# Patient Record
Sex: FEMALE | Race: BLACK OR AFRICAN AMERICAN | Employment: UNEMPLOYED | URBAN - METROPOLITAN AREA
[De-identification: names, ages, dates, MRNs, and addresses within clinical notes are randomized per-mention and may not be internally consistent; named-entity substitution may affect disease eponyms.]

---

## 2017-05-31 ENCOUNTER — OFFICE VISIT (OUTPATIENT)
Dept: INTERNAL MEDICINE CLINIC | Age: 27
End: 2017-05-31

## 2017-05-31 ENCOUNTER — HOSPITAL ENCOUNTER (OUTPATIENT)
Dept: LAB | Age: 27
Discharge: HOME | End: 2017-05-31

## 2017-05-31 VITALS
SYSTOLIC BLOOD PRESSURE: 103 MMHG | DIASTOLIC BLOOD PRESSURE: 69 MMHG | OXYGEN SATURATION: 96 % | HEIGHT: 62 IN | HEART RATE: 83 BPM | WEIGHT: 139.8 LBS | TEMPERATURE: 98.6 F | BODY MASS INDEX: 25.73 KG/M2 | RESPIRATION RATE: 18 BRPM

## 2017-05-31 DIAGNOSIS — Z13.228 SCREENING FOR ENDOCRINE, NUTRITIONAL, METABOLIC AND IMMUNITY DISORDER: ICD-10-CM

## 2017-05-31 DIAGNOSIS — Z01.419 WELL WOMAN EXAM WITH ROUTINE GYNECOLOGICAL EXAM: Primary | ICD-10-CM

## 2017-05-31 DIAGNOSIS — Z13.0 SCREENING FOR ENDOCRINE, NUTRITIONAL, METABOLIC AND IMMUNITY DISORDER: ICD-10-CM

## 2017-05-31 DIAGNOSIS — Z13.21 SCREENING FOR ENDOCRINE, NUTRITIONAL, METABOLIC AND IMMUNITY DISORDER: ICD-10-CM

## 2017-05-31 DIAGNOSIS — Z11.4 SCREENING FOR HIV WITHOUT PRESENCE OF RISK FACTORS: ICD-10-CM

## 2017-05-31 DIAGNOSIS — Z13.220 LIPID SCREENING: ICD-10-CM

## 2017-05-31 DIAGNOSIS — Z13.29 SCREENING FOR ENDOCRINE, NUTRITIONAL, METABOLIC AND IMMUNITY DISORDER: ICD-10-CM

## 2017-05-31 DIAGNOSIS — R45.86 MOOD CHANGES: ICD-10-CM

## 2017-05-31 LAB
BILIRUB UR QL STRIP: NEGATIVE
GLUCOSE UR-MCNC: NEGATIVE MG/DL
KETONES P FAST UR STRIP-MCNC: NEGATIVE MG/DL
PH UR STRIP: 6 [PH] (ref 4.6–8)
PROT UR QL STRIP: NEGATIVE MG/DL
SP GR UR STRIP: 1 (ref 1–1.03)
UA UROBILINOGEN AMB POC: NORMAL (ref 0.2–1)
URINALYSIS CLARITY POC: CLEAR
URINALYSIS COLOR POC: YELLOW
URINE BLOOD POC: NEGATIVE
URINE LEUKOCYTES POC: NEGATIVE
URINE NITRITES POC: NEGATIVE

## 2017-05-31 PROCEDURE — 99001 SPECIMEN HANDLING PT-LAB: CPT | Performed by: NURSE PRACTITIONER

## 2017-05-31 NOTE — PROGRESS NOTES
Pt is here for   Chief Complaint   Patient presents with    Complete Physical     with pap, pt states she would like HIV test included in labs     Pt denies pain at this time    1. Have you been to the ER, urgent care clinic since your last visit? Hospitalized since your last visit? No    2. Have you seen or consulted any other health care providers outside of the [address] [place] [address] since your last visit? Include any pap smears or colon screening.  No

## 2017-05-31 NOTE — MR AVS SNAPSHOT
Visit Information Date & Time Provider Department Dept. Phone Encounter #  
 5/31/2017  2:20 PM Tram Reyes NP [id number] [facility] [phone] [id number] Follow-up Instructions Return in about 1 year (around 5/31/2018) for well woman with labs. Upcoming Health Maintenance Date Due  
 HPV AGE 9Y-34Y (1 of 3 - Female 3 Dose Series) 2/13/2003 DTaP/Tdap/Td series (1 - Tdap) 2/13/2013 INFLUENZA AGE 9 TO ADULT 8/1/2017 PAP AKA CERVICAL CYTOLOGY 6/27/2019 Allergies as of 5/31/2017  Review Complete On: 5/31/2017 By: Tram Reyes NP No Known Allergies Current Immunizations  Never Reviewed Name Date Tdap  Incomplete Not reviewed this visit You Were Diagnosed With   
  
 Codes Comments Well woman exam with routine gynecological exam    -  Primary ICD-10-CM: L17.152 ICD-9-CM: V72.31 Encounter for immunization     ICD-10-CM: V75 ICD-9-CM: V03.89 Screening for HIV without presence of risk factors     ICD-10-CM: Z11.4 ICD-9-CM: V73.89 Screening for endocrine, nutritional, metabolic and immunity disorder     ICD-10-CM: Z13.29, Z13.21, Z13.228, Z13.0 ICD-9-CM: V77.99 Lipid screening     ICD-10-CM: W67.407 ICD-9-CM: V77.91 Vitals BP Pulse Temp Resp Height(growth percentile) Weight(growth percentile) 103/69 (BP 1 Location: Right arm, BP Patient Position: Sitting) 83 98.6 °F (37 °C) (Oral) 18 5' 2\" (1.575 m) 139 lb 12.8 oz (63.4 kg) LMP SpO2 BMI OB Status Smoking Status 05/22/2017 96% 25.57 kg/m2 Having regular periods Current Some Day Smoker Vitals History BMI and BSA Data Body Mass Index Body Surface Area 25.57 kg/m 2 1.67 m 2 Preferred Pharmacy Pharmacy Name Phone [email or web address], [address] [phone] Your Updated Medication List  
  
Notice  As of 5/31/2017  2:58 PM  
 You have not been prescribed any medications. We Performed the Following AMB POC URINALYSIS DIP STICK AUTO W/ MICRO [67485 CPT(R)] AMB POC URINALYSIS DIP STICK AUTO W/O MICRO [30026 CPT(R)] CBC WITH AUTOMATED DIFF [99842 CPT(R)] HIV 1/2 AG/AB, 4TH GENERATION,W RFLX CONFIRM [[id number] Custom] LIPID PANEL [22088 CPT(R)] METABOLIC PANEL, COMPREHENSIVE [57326 CPT(R)] [address] [id number] Custom] TETANUS, DIPHTHERIA TOXOIDS AND ACELLULAR PERTUSSIS VACCINE (TDAP), IN INDIVIDS. >=7, IM [id number] CPT(R)] Follow-up Instructions Return in about 1 year (around 2018) for well woman with labs. Patient Instructions Tdap (Tetanus, Diphtheria, Pertussis) Vaccine: What You Need to Know Why get vaccinated? Tetanus, diphtheria, and pertussis are very serious diseases. Tdap vaccine can protect us from these diseases. And Tdap vaccine given to pregnant women can protect  babies against pertussis. Tetanus (lockjaw) is rare in the [facility] today. It causes painful muscle tightening and stiffness, usually all over the body. · It can lead to tightening of muscles in the head and neck so you can't open your mouth, swallow, or sometimes even breathe. Tetanus kills about 1 out of 10 people who are infected even after receiving the best medical care. Diphtheria is also rare in the [place] today. It can cause a thick coating to form in the back of the throat. · It can lead to breathing problems, heart failure, paralysis, and death. Pertussis (whooping cough) causes severe coughing spells, which can cause difficulty breathing, vomiting, and disturbed sleep. · It can also lead to weight loss, incontinence, and rib fractures. Up to 2 in 100 adolescents and 5 in 100 adults with pertussis are hospitalized or have complications, which could include pneumonia or death. These diseases are caused by bacteria.  Diphtheria and pertussis are spread from person to person through secretions from coughing or sneezing. Tetanus enters the body through cuts, scratches, or wounds. Before vaccines, as many as 200,000 cases of diphtheria, 200,000 cases of pertussis, and hundreds of cases of tetanus were reported in the [place] each year. Since vaccination began, reports of cases for tetanus and diphtheria have dropped by about 99% and for pertussis by about 80%. Tdap vaccine The Tdap vaccine can protect adolescents and adults from tetanus, diphtheria, and pertussis. One dose of Tdap is routinely given at age 6 or 15. People who did not get Tdap at that age should get it as soon as possible. Tdap is especially important for health care professionals and anyone having close contact with a baby younger than 12 months. Pregnant women should get a dose of Tdap during every pregnancy, to protect the  from pertussis. Infants are most at risk for severe, life-threatening complications from pertussis. Another vaccine, called Td, protects against tetanus and diphtheria, but not pertussis. A Td booster should be given every 10 years. Tdap may be given as one of these boosters if you have never gotten Tdap before. Tdap may also be given after a severe cut or burn to prevent tetanus infection. Your doctor or the person giving you the vaccine can give you more information. Tdap may safely be given at the same time as other vaccines. Some people should not get this vaccine · A person who has ever had a life-threatening allergic reaction after a previous dose of any diphtheria-, tetanus-, or pertussis-containing vaccine, OR has a severe allergy to any part of this vaccine, should not get Tdap vaccine. Tell the person giving the vaccine about any severe allergies.  
· Anyone who had coma or long repeated seizures within 7 days after a childhood dose of DTP or DTaP, or a previous dose of Tdap, should not get Tdap, unless a cause other than the vaccine was found. They can still get Td. · Talk to your doctor if you: 
¨ Have seizures or another nervous system problem. ¨ Had severe pain or swelling after any vaccine containing diphtheria, tetanus, or pertussis. ¨ Ever had a condition called Guillain-Barré Syndrome (GBS). ¨ Aren't feeling well on the day the shot is scheduled. Risks With any medicine, including vaccines, there is a chance of side effects. These are usually mild and go away on their own. Serious reactions are also possible but are rare. Most people who get Tdap vaccine do not have any problems with it. Mild problems following Tdap 
(Did not interfere with activities) · Pain where the shot was given (about 3 in 4 adolescents or 2 in 3 adults) · Redness or swelling where the shot was given (about 1 person in 5) · Mild fever of at least 100.4°F (up to about 1 in 25 adolescents or 1 in 100 adults) · Headache (about 3 or 4 people in 10) · Tiredness (about 1 person in 3 or 4) · Nausea, vomiting, diarrhea, stomachache (up to 1 in 4 adolescents or 1 in 10 adults) · Chills, sore joints (about 1 person in 10) · Body aches (about 1 person in 3 or 4) · Rash, swollen glands (uncommon) Moderate problems following Tdap (Interfered with activities, but did not require medical attention) · Pain where the shot was given (up to 1 in 5 or 6) · Redness or swelling where the shot was given (up to about 1 in 16 adolescents or 1 in 12 adults) · Fever over 102°F (about 1 in 100 adolescents or 1 in 250 adults) · Headache (about 1 in 7 adolescents or 1 in 10 adults) · Nausea, vomiting, diarrhea, stomachache (up to 1 to 3 people in 100) · Swelling of the entire arm where the shot was given (up to about 1 in 500) Severe problems following Tdap 
(Unable to perform usual activities; required medical attention) · Swelling, severe pain, bleeding and redness in the arm where the shot was given (rare) Problems that could happen after any vaccine: · People sometimes faint after a medical procedure, including vaccination. Sitting or lying down for about 15 minutes can help prevent fainting, and injuries caused by a fall. Tell your doctor if you feel dizzy or have vision changes or ringing in the ears. · Some people get severe pain in the shoulder and have difficulty moving the arm where a shot was given. This happens very rarely. · Any medication can cause a severe allergic reaction. Such reactions from a vaccine are very rare, estimated at fewer than 1 in a million doses, and would happen within a few minutes to a few hours after the vaccination. As with any medicine, there is a very remote chance of a vaccine causing a serious injury or death. The safety of vaccines is always being monitored. For more information, visit: www.cdc.gov/vaccinesafety. What if there is a serious problem? What should I look for? · Look for anything that concerns you, such as signs of a severe allergic reaction, very high fever, or unusual behavior. Signs of a severe allergic reaction can include hives, swelling of the face and throat, difficulty breathing, a fast heartbeat, dizziness, and weakness. These would usually start a few minutes to a few hours after the vaccination. What should I do? · If you think it is a severe allergic reaction or other emergency that can't wait, call 9-1-1 or get the person to the nearest hospital. Otherwise, call your doctor. · Afterward, the reaction should be reported to the Vaccine Adverse Event Reporting System (VAERS). Your doctor might file this report, or you can do it yourself through the VAERS web site at www.vaers. hhs.gov, or by calling 9-460.753.1794. VAERS does not give medical advice.  
The [employer] [place] Vaccine Injury Compensation Program 
The National Vaccine Injury Compensation Program (VICP) is a federal program that was created to compensate people who may have been injured by certain vaccines. Persons who believe they may have been injured by a vaccine can learn about the program and about filing a claim by calling 9-626.472.1054 or visiting the [address] website at www.[facility].gov/vaccinecompensation. There is a time limit to file a claim for compensation. How can I learn more? · Ask your doctor. He or she can give you the vaccine package insert or suggest other sources of information. · Call your local or state health department. · Contact the Centers for Disease Control and Prevention (CDC): 
¨ Call 2-603.329.6820 (1-800-CDC-INFO) or ¨ Visit CDC's website at www.cdc.gov/vaccines Vaccine Information Statement (Interim) Tdap Vaccine 
(2/24/15) [address] KAR[address]z 218AK-55 [facility] and [employer] Centers for Disease Control and Prevention Many Vaccine Information Statements are available in [place] and other languages. See www.immunize.org/vis. Muchas hojas de información sobre vacunas están disponibles en español y en otros idiomas. Visite www.immunize.org/vis. Care instructions adapted under license by your healthcare professional. If you have questions about a medical condition or this instruction, always ask your healthcare professional. [address] any warranty or liability for your use of this information. Well Visit, Ages 25 to 48: Care Instructions Your Care Instructions Physical exams can help you stay healthy. Your doctor has checked your overall health and may have suggested ways to take good care of yourself. He or she also may have recommended tests. At home, you can help prevent illness with healthy eating, regular exercise, and other steps. Follow-up care is a key part of your treatment and safety. Be sure to make and go to all appointments, and call your doctor if you are having problems. It's also a good idea to know your test results and keep a list of the medicines you take. How can you care for yourself at home? · Reach and stay at a healthy weight. This will lower your risk for many problems, such as obesity, diabetes, heart disease, and high blood pressure. · Get at least 30 minutes of physical activity on most days of the week. Walking is a good choice. You also may want to do other activities, such as running, swimming, cycling, or playing tennis or team sports. Discuss any changes in your exercise program with your doctor. · Do not smoke or allow others to smoke around you. If you need help quitting, talk to your doctor about stop-smoking programs and medicines. These can increase your chances of quitting for good. · Talk to your doctor about whether you have any risk factors for sexually transmitted infections (STIs). Having one sex partner (who does not have STIs and does not have sex with anyone else) is a good way to avoid these infections. · Use birth control if you do not want to have children at this time. Talk with your doctor about the choices available and what might be best for you. · Protect your skin from too much sun. When you're outdoors from 10 a.m. to 4 p.m., stay in the shade or cover up with clothing and a hat with a wide brim. Wear sunglasses that block UV rays. Even when it's cloudy, put broad-spectrum sunscreen (SPF 30 or higher) on any exposed skin. · See a dentist one or two times a year for checkups and to have your teeth cleaned. · Wear a seat belt in the car. · Drink alcohol in moderation, if at all. That means no more than 2 drinks a day for men and 1 drink a day for women. Follow your doctor's advice about when to have certain tests. These tests can spot problems early. For everyone · Cholesterol. Have the fat (cholesterol) in your blood tested after age 21. Your doctor will tell you how often to have this done based on your age, family history, or other things that can increase your risk for heart disease. · Blood pressure. Have your blood pressure checked during a routine doctor visit. Your doctor will tell you how often to check your blood pressure based on your age, your blood pressure results, and other factors. · Vision. Talk with your doctor about how often to have a glaucoma test. 
· Diabetes. Ask your doctor whether you should have tests for diabetes. · Colon cancer. Have a test for colon cancer at age 48. You may have one of several tests. If you are younger than 48, you may need a test earlier if you have any risk factors. Risk factors include whether you already had a precancerous polyp removed from your colon or whether your parent, brother, sister, or child has had colon cancer. For women · Breast exam and mammogram. Talk to your doctor about when you should have a clinical breast exam and a mammogram. Medical experts differ on whether and how often women under 50 should have these tests. Your doctor can help you decide what is right for you. · Pap test and pelvic exam. Begin Pap tests at age 24. A Pap test is the best way to find cervical cancer. The test often is part of a pelvic exam. Ask how often to have this test. 
· Tests for sexually transmitted infections (STIs). Ask whether you should have tests for STIs. You may be at risk if you have sex with more than one person, especially if your partners do not wear condoms. For men · Tests for sexually transmitted infections (STIs). Ask whether you should have tests for STIs. You may be at risk if you have sex with more than one person, especially if you do not wear a condom. · Testicular cancer exam. Ask your doctor whether you should check your testicles regularly. · Prostate exam. Talk to your doctor about whether you should have a blood test (called a PSA test) for prostate cancer.  Experts differ on whether and when men should have this test. Some experts suggest it if you are older than 39 and are -American or have a father or brother who got prostate cancer when he was younger than 72. When should you call for help? Watch closely for changes in your health, and be sure to contact your doctor if you have any problems or symptoms that concern you. Where can you learn more? Go to http://hema-arron.info/. Enter P072 in the search box to learn more about \"Well Visit, Ages 25 to 48: Care Instructions. \" Current as of: July 19, 2016 Content Version: 11.2 © 7210-0452 [employer]. Care instructions adapted under license by [employer] (which disclaims liability or warranty for this information). If you have questions about a medical condition or this instruction, always ask your healthcare professional. [address] any warranty or liability for your use of this information. Introducing hospitals & HEALTH SERVICES! Ronny Martinez introduces Contacts+ patient portal. Now you can access parts of your medical record, email your doctor's office, and request medication refills online. 1. In your internet browser, go to [email or web address] 2. Click on the First Time User? Click Here link in the Sign In box. You will see the New Member Sign Up page. 3. Enter your Contacts+ Access Code exactly as it appears below. You will not need to use this code after youve completed the sign-up process. If you do not sign up before the expiration date, you must request a new code. · Contacts+ Access Code: [email or web address] Expires: 8/21/2017 12:41 PM 
 
4. Enter the last four digits of your Social Security Number (xxxx) and Date of Birth (mm/dd/yyyy) as indicated and click Submit. You will be taken to the next sign-up page. 5. Create a Contacts+ ID. This will be your Contacts+ login ID and cannot be changed, so think of one that is secure and easy to remember. 6. Create a [employer] password. You can change your password at any time. 7. Enter your Password Reset Question and Answer. This can be used at a later time if you forget your password. 8. Enter your e-mail address. You will receive e-mail notification when new information is available in [address] 9. Click Sign Up. You can now view and download portions of your medical record. 10. Click the Download Summary menu link to download a portable copy of your medical information. If you have questions, please visit the Frequently Asked Questions section of the [employer] website. Remember, [employer] is NOT to be used for urgent needs. For medical emergencies, dial 911. Now available from your iPhone and Android! Please provide this summary of care documentation to your next provider. Your primary care clinician is listed as JANICE High. If you have any questions after today's visit, please call [phone].

## 2017-05-31 NOTE — PATIENT INSTRUCTIONS
Tdap (Tetanus, Diphtheria, Pertussis) Vaccine: What You Need to Know  Why get vaccinated? Tetanus, diphtheria, and pertussis are very serious diseases. Tdap vaccine can protect us from these diseases. And Tdap vaccine given to pregnant women can protect  babies against pertussis. Tetanus (lockjaw) is rare in the [facility] today. It causes painful muscle tightening and stiffness, usually all over the body. · It can lead to tightening of muscles in the head and neck so you can't open your mouth, swallow, or sometimes even breathe. Tetanus kills about 1 out of 10 people who are infected even after receiving the best medical care. Diphtheria is also rare in the [place] today. It can cause a thick coating to form in the back of the throat. · It can lead to breathing problems, heart failure, paralysis, and death. Pertussis (whooping cough) causes severe coughing spells, which can cause difficulty breathing, vomiting, and disturbed sleep. · It can also lead to weight loss, incontinence, and rib fractures. Up to 2 in 100 adolescents and 5 in 100 adults with pertussis are hospitalized or have complications, which could include pneumonia or death. These diseases are caused by bacteria. Diphtheria and pertussis are spread from person to person through secretions from coughing or sneezing. Tetanus enters the body through cuts, scratches, or wounds. Before vaccines, as many as 200,000 cases of diphtheria, 200,000 cases of pertussis, and hundreds of cases of tetanus were reported in the [place] each year. Since vaccination began, reports of cases for tetanus and diphtheria have dropped by about 99% and for pertussis by about 80%. Tdap vaccine  The Tdap vaccine can protect adolescents and adults from tetanus, diphtheria, and pertussis. One dose of Tdap is routinely given at age 6 or 15. People who did not get Tdap at that age should get it as soon as possible.   Tdap is especially important for health care professionals and anyone having close contact with a baby younger than 12 months. Pregnant women should get a dose of Tdap during every pregnancy, to protect the  from pertussis. Infants are most at risk for severe, life-threatening complications from pertussis. Another vaccine, called Td, protects against tetanus and diphtheria, but not pertussis. A Td booster should be given every 10 years. Tdap may be given as one of these boosters if you have never gotten Tdap before. Tdap may also be given after a severe cut or burn to prevent tetanus infection. Your doctor or the person giving you the vaccine can give you more information. Tdap may safely be given at the same time as other vaccines. Some people should not get this vaccine  · A person who has ever had a life-threatening allergic reaction after a previous dose of any diphtheria-, tetanus-, or pertussis-containing vaccine, OR has a severe allergy to any part of this vaccine, should not get Tdap vaccine. Tell the person giving the vaccine about any severe allergies. · Anyone who had coma or long repeated seizures within 7 days after a childhood dose of DTP or DTaP, or a previous dose of Tdap, should not get Tdap, unless a cause other than the vaccine was found. They can still get Td. · Talk to your doctor if you:  ¨ Have seizures or another nervous system problem. ¨ Had severe pain or swelling after any vaccine containing diphtheria, tetanus, or pertussis. ¨ Ever had a condition called Guillain-Barré Syndrome (GBS). ¨ Aren't feeling well on the day the shot is scheduled. Risks  With any medicine, including vaccines, there is a chance of side effects. These are usually mild and go away on their own. Serious reactions are also possible but are rare. Most people who get Tdap vaccine do not have any problems with it.   Mild problems following Tdap  (Did not interfere with activities)  · Pain where the shot was given (about 3 in 4 adolescents or 2 in 3 adults)  · Redness or swelling where the shot was given (about 1 person in 5)  · Mild fever of at least 100.4°F (up to about 1 in 25 adolescents or 1 in 100 adults)  · Headache (about 3 or 4 people in 10)  · Tiredness (about 1 person in 3 or 4)  · Nausea, vomiting, diarrhea, stomachache (up to 1 in 4 adolescents or 1 in 10 adults)  · Chills, sore joints (about 1 person in 10)  · Body aches (about 1 person in 3 or 4)  · Rash, swollen glands (uncommon)  Moderate problems following Tdap  (Interfered with activities, but did not require medical attention)  · Pain where the shot was given (up to 1 in 5 or 6)  · Redness or swelling where the shot was given (up to about 1 in 16 adolescents or 1 in 12 adults)  · Fever over 102°F (about 1 in 100 adolescents or 1 in 250 adults)  · Headache (about 1 in 7 adolescents or 1 in 10 adults)  · Nausea, vomiting, diarrhea, stomachache (up to 1 to 3 people in 100)  · Swelling of the entire arm where the shot was given (up to about 1 in 500)  Severe problems following Tdap  (Unable to perform usual activities; required medical attention)  · Swelling, severe pain, bleeding and redness in the arm where the shot was given (rare)  Problems that could happen after any vaccine:  · People sometimes faint after a medical procedure, including vaccination. Sitting or lying down for about 15 minutes can help prevent fainting, and injuries caused by a fall. Tell your doctor if you feel dizzy or have vision changes or ringing in the ears. · Some people get severe pain in the shoulder and have difficulty moving the arm where a shot was given. This happens very rarely. · Any medication can cause a severe allergic reaction. Such reactions from a vaccine are very rare, estimated at fewer than 1 in a million doses, and would happen within a few minutes to a few hours after the vaccination.   As with any medicine, there is a very remote chance of a vaccine causing a serious injury or death. The safety of vaccines is always being monitored. For more information, visit: www.cdc.gov/vaccinesafety. What if there is a serious problem? What should I look for? · Look for anything that concerns you, such as signs of a severe allergic reaction, very high fever, or unusual behavior. Signs of a severe allergic reaction can include hives, swelling of the face and throat, difficulty breathing, a fast heartbeat, dizziness, and weakness. These would usually start a few minutes to a few hours after the vaccination. What should I do? · If you think it is a severe allergic reaction or other emergency that can't wait, call 9-1-1 or get the person to the nearest hospital. Otherwise, call your doctor. · Afterward, the reaction should be reported to the Vaccine Adverse Event Reporting System (VAERS). Your doctor might file this report, or you can do it yourself through the VAERS web site at www.vaers. [facility].gov, or by calling 3-523.650.4203. VAERS does not give medical advice. The National Vaccine Injury Compensation Program  The National Vaccine Injury Compensation Program (VICP) is a federal program that was created to compensate people who may have been injured by certain vaccines. Persons who believe they may have been injured by a vaccine can learn about the program and about filing a claim by calling 4-325.514.2055 or visiting the [facility] website at www.[facility].gov/vaccinecompensation. There is a time limit to file a claim for compensation. How can I learn more? · Ask your doctor. He or she can give you the vaccine package insert or suggest other sources of information. · Call your local or state health department. · Contact the Centers for Disease Control and Prevention (CDC):  ¨ Call 2-708.643.3936 (1-800-CDC-INFO) or  ¨ Visit CDC's website at www.cdc.gov/vaccines  Vaccine Information Statement (Interim)  Tdap Vaccine  (2/24/15)  42 KARLY Fernando [id number]  [facility] for Disease Control and Prevention  Many Vaccine Information Statements are available in [place]er and other languages. See www.immunize.org/vis. Muchas hojas de información sobre vacunas están disponibles en español y en otros idiomas. Visite www.immunize.org/vis. Care instructions adapted under license by your healthcare professional. If you have questions about a medical condition or this instruction, always ask your healthcare professional. [place] [address] any warranty or liability for your use of this information. Well Visit, Ages 25 to 48: Care Instructions  Your Care Instructions  Physical exams can help you stay healthy. Your doctor has checked your overall health and may have suggested ways to take good care of yourself. He or she also may have recommended tests. At home, you can help prevent illness with healthy eating, regular exercise, and other steps. Follow-up care is a key part of your treatment and safety. Be sure to make and go to all appointments, and call your doctor if you are having problems. It's also a good idea to know your test results and keep a list of the medicines you take. How can you care for yourself at home? · Reach and stay at a healthy weight. This will lower your risk for many problems, such as obesity, diabetes, heart disease, and high blood pressure. · Get at least 30 minutes of physical activity on most days of the week. Walking is a good choice. You also may want to do other activities, such as running, swimming, cycling, or playing tennis or team sports. Discuss any changes in your exercise program with your doctor. · Do not smoke or allow others to smoke around you. If you need help quitting, talk to your doctor about stop-smoking programs and medicines. These can increase your chances of quitting for good. · Talk to your doctor about whether you have any risk factors for sexually transmitted infections (STIs).  Having one sex partner (who does not have STIs and does not have sex with anyone else) is a good way to avoid these infections. · Use birth control if you do not want to have children at this time. Talk with your doctor about the choices available and what might be best for you. · Protect your skin from too much sun. When you're outdoors from 10 a.m. to 4 p.m., stay in the shade or cover up with clothing and a hat with a wide brim. Wear sunglasses that block UV rays. Even when it's cloudy, put broad-spectrum sunscreen (SPF 30 or higher) on any exposed skin. · See a dentist one or two times a year for checkups and to have your teeth cleaned. · Wear a seat belt in the car. · Drink alcohol in moderation, if at all. That means no more than 2 drinks a day for men and 1 drink a day for women. Follow your doctor's advice about when to have certain tests. These tests can spot problems early. For everyone  · Cholesterol. Have the fat (cholesterol) in your blood tested after age 21. Your doctor will tell you how often to have this done based on your age, family history, or other things that can increase your risk for heart disease. · Blood pressure. Have your blood pressure checked during a routine doctor visit. Your doctor will tell you how often to check your blood pressure based on your age, your blood pressure results, and other factors. · Vision. Talk with your doctor about how often to have a glaucoma test.  · Diabetes. Ask your doctor whether you should have tests for diabetes. · Colon cancer. Have a test for colon cancer at age 48. You may have one of several tests. If you are younger than 48, you may need a test earlier if you have any risk factors. Risk factors include whether you already had a precancerous polyp removed from your colon or whether your parent, brother, sister, or child has had colon cancer.   For women  · Breast exam and mammogram. Talk to your doctor about when you should have a clinical breast exam and a mammogram. Medical experts differ on whether and how often women under 50 should have these tests. Your doctor can help you decide what is right for you. · Pap test and pelvic exam. Begin Pap tests at age 24. A Pap test is the best way to find cervical cancer. The test often is part of a pelvic exam. Ask how often to have this test.  · Tests for sexually transmitted infections (STIs). Ask whether you should have tests for STIs. You may be at risk if you have sex with more than one person, especially if your partners do not wear condoms. For men  · Tests for sexually transmitted infections (STIs). Ask whether you should have tests for STIs. You may be at risk if you have sex with more than one person, especially if you do not wear a condom. · Testicular cancer exam. Ask your doctor whether you should check your testicles regularly. · Prostate exam. Talk to your doctor about whether you should have a blood test (called a PSA test) for prostate cancer. Experts differ on whether and when men should have this test. Some experts suggest it if you are older than 39 and are -American or have a father or brother who got prostate cancer when he was younger than 72. When should you call for help? Watch closely for changes in your health, and be sure to contact your doctor if you have any problems or symptoms that concern you. Where can you learn more? Go to http://hema-arron.info/. Enter P072 in the search box to learn more about \"Well Visit, Ages 25 to 48: Care Instructions. \"  Current as of: July 19, 2016  Content Version: 11.2  © 5088-3138 [employer]. Care instructions adapted under license by [employer] (which disclaims liability or warranty for this information). If you have questions about a medical condition or this instruction, always ask your healthcare professional. [place] [address] any warranty or liability for your use of this information.
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
## 2017-06-05 DIAGNOSIS — A59.01 TRICHOMONAS VAGINITIS: Primary | ICD-10-CM

## 2017-06-05 LAB
A VAGINAE DNA VAG QL NAA+PROBE: ABNORMAL SCORE
BVAB2 DNA VAG QL NAA+PROBE: ABNORMAL SCORE
C ALBICANS DNA VAG QL NAA+PROBE: NEGATIVE
C GLABRATA DNA VAG QL NAA+PROBE: NEGATIVE
C TRACH RRNA SPEC QL NAA+PROBE: NEGATIVE
MEGA1 DNA VAG QL NAA+PROBE: ABNORMAL SCORE
N GONORRHOEA RRNA SPEC QL NAA+PROBE: NEGATIVE
T VAGINALIS RRNA SPEC QL NAA+PROBE: POSITIVE

## 2017-06-05 RX ORDER — METRONIDAZOLE 500 MG/1
2000 TABLET ORAL ONCE
Qty: 4 TAB | Refills: 0 | Status: SHIPPED | OUTPATIENT
Start: 2017-06-05 | End: 2017-06-05

## 2017-07-03 ENCOUNTER — OFFICE VISIT (OUTPATIENT)
Dept: BEHAVIORAL/MENTAL HEALTH CLINIC | Age: 27
End: 2017-07-03

## 2017-07-03 VITALS
SYSTOLIC BLOOD PRESSURE: 107 MMHG | OXYGEN SATURATION: 98 % | BODY MASS INDEX: 26.13 KG/M2 | HEIGHT: 62 IN | WEIGHT: 142 LBS | HEART RATE: 70 BPM | DIASTOLIC BLOOD PRESSURE: 75 MMHG

## 2017-07-03 DIAGNOSIS — F12.10 CANNABIS ABUSE: ICD-10-CM

## 2017-07-03 DIAGNOSIS — F39 MOOD DISORDER (HCC): Primary | ICD-10-CM

## 2017-07-03 RX ORDER — CITALOPRAM 20 MG/1
TABLET, FILM COATED ORAL
Qty: 30 TAB | Refills: 1 | Status: SHIPPED | OUTPATIENT
Start: 2017-07-03 | End: 2019-07-30
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
## 2017-07-13 LAB
A VAGINAE DNA VAG QL NAA+PROBE: NORMAL SCORE
ALBUMIN SERPL-MCNC: 4.7 G/DL (ref 3.5–5.5)
ALBUMIN/GLOB SERPL: 1.6 {RATIO} (ref 1.2–2.2)
ALP SERPL-CCNC: 44 IU/L (ref 39–117)
ALT SERPL-CCNC: 9 IU/L (ref 0–32)
AST SERPL-CCNC: 13 IU/L (ref 0–40)
BASOPHILS # BLD AUTO: 0 X10E3/UL (ref 0–0.2)
BASOPHILS NFR BLD AUTO: 1 %
BILIRUB SERPL-MCNC: 0.6 MG/DL (ref 0–1.2)
BUN SERPL-MCNC: 8 MG/DL (ref 6–20)
BUN/CREAT SERPL: 12 (ref 9–23)
CALCIUM SERPL-MCNC: 9.6 MG/DL (ref 8.7–10.2)
CHLORIDE SERPL-SCNC: 99 MMOL/L (ref 96–106)
CHOLEST SERPL-MCNC: 181 MG/DL (ref 100–199)
CO2 SERPL-SCNC: 25 MMOL/L (ref 18–29)
CREAT SERPL-MCNC: 0.66 MG/DL (ref 0.57–1)
EOSINOPHIL # BLD AUTO: 0.1 X10E3/UL (ref 0–0.4)
EOSINOPHIL NFR BLD AUTO: 2 %
ERYTHROCYTE [DISTWIDTH] IN BLOOD BY AUTOMATED COUNT: 12.8 % (ref 12.3–15.4)
GLOBULIN SER CALC-MCNC: 2.9 G/DL (ref 1.5–4.5)
GLUCOSE SERPL-MCNC: 81 MG/DL (ref 65–99)
HCT VFR BLD AUTO: 40.7 % (ref 34–46.6)
HDLC SERPL-MCNC: 63 MG/DL
HGB BLD-MCNC: 13.9 G/DL (ref 11.1–15.9)
HIV 1+2 AB+HIV1 P24 AG SERPL QL IA: NON REACTIVE
IMM GRANULOCYTES # BLD: 0 X10E3/UL (ref 0–0.1)
IMM GRANULOCYTES NFR BLD: 0 %
INTERPRETATION, 910389: NORMAL
LDLC SERPL CALC-MCNC: 99 MG/DL (ref 0–99)
LYMPHOCYTES # BLD AUTO: 2 X10E3/UL (ref 0.7–3.1)
LYMPHOCYTES NFR BLD AUTO: 48 %
MCH RBC QN AUTO: 32.5 PG (ref 26.6–33)
MCHC RBC AUTO-ENTMCNC: 34.2 G/DL (ref 31.5–35.7)
MCV RBC AUTO: 95 FL (ref 79–97)
MONOCYTES # BLD AUTO: 0.2 X10E3/UL (ref 0.1–0.9)
MONOCYTES NFR BLD AUTO: 5 %
NEUTROPHILS # BLD AUTO: 1.8 X10E3/UL (ref 1.4–7)
NEUTROPHILS NFR BLD AUTO: 44 %
PLATELET # BLD AUTO: 323 X10E3/UL (ref 150–379)
POTASSIUM SERPL-SCNC: 4 MMOL/L (ref 3.5–5.2)
PROT SERPL-MCNC: 7.6 G/DL (ref 6–8.5)
RBC # BLD AUTO: 4.28 X10E6/UL (ref 3.77–5.28)
SODIUM SERPL-SCNC: 140 MMOL/L (ref 134–144)
TRIGL SERPL-MCNC: 96 MG/DL (ref 0–149)
VLDLC SERPL CALC-MCNC: 19 MG/DL (ref 5–40)
WBC # BLD AUTO: 4.1 X10E3/UL (ref 3.4–10.8)

## 2017-08-23 ENCOUNTER — DOCUMENTATION ONLY (OUTPATIENT)
Dept: BEHAVIORAL/MENTAL HEALTH CLINIC | Age: 27
End: 2017-08-23

## 2017-08-23 NOTE — LETTER
8/23/2017 Ms. Chivo Harvey [id number] Dear Ms. Bhupendra Quintanilla, 
 
A good relationship between provider and patient is essential for quality medical care. The provider/patient relationship depends upon mutual trust, respect, and rapport. Our relationship has reached a point where these criteria are no longer intact. You have consistently failed to come in for scheduled appointments (no shows: August 2016, August 2017, cancelled August 2017). For this reason, I will no longer continue to serve as your provider and [facility] must withdraw from your medical care and treatment effective this date. Although I have terminated our providerpatient relationship, I will assist you with the transition of your health care to other providers, as follows: 
 
1. On-Going/Acute Care. I will provide on-going/acute care for you for a period of 30 days, but in no case later than 9/21/17. After this date, you may seek care with another provider or your local emergency room. 2. Referrals for Future Medical Care. As you may need medical attention in the future, I recommend that you promptly find another provider to care for you. You may require ongoing medical attention for any current or future medical conditions, including but not limited to the following: anxiety/ depression. You may contact your insurance company or the [email or web address] for a [employer] [facility] [employer] [facility] [employer] of physicians who are accepting new patients 3. Medical Records. I will provide you or your new health care provider with a copy of your records upon your request.  Since your records are confidential, Ill require your written authorization to make them available to another provider. Enclosed is an authorization form. Please complete it and return it to me so that we may transition your care. I extend to you my best wishes for your future health. Sincerely, Matheus Rangel, NP Enclosure

## 2017-08-23 NOTE — PROGRESS NOTES
No show August 2016, never came back in till July 2017, cancelled appt in August 2017, then no showed to rescheduled appt in august 2018  Termination letter sent.

## 2018-11-07 PROBLEM — J45.909 ASTHMA: Status: ACTIVE | Noted: 2018-11-07

## 2019-07-30 ENCOUNTER — OFFICE VISIT (OUTPATIENT)
Dept: INTERNAL MEDICINE CLINIC | Age: 29
End: 2019-07-30

## 2019-07-30 VITALS
OXYGEN SATURATION: 97 % | RESPIRATION RATE: 17 BRPM | TEMPERATURE: 98.7 F | SYSTOLIC BLOOD PRESSURE: 100 MMHG | HEIGHT: 62 IN | DIASTOLIC BLOOD PRESSURE: 68 MMHG | HEART RATE: 71 BPM | WEIGHT: 164 LBS | BODY MASS INDEX: 30.18 KG/M2

## 2019-07-30 DIAGNOSIS — Z13.220 SCREENING FOR LIPID DISORDERS: ICD-10-CM

## 2019-07-30 DIAGNOSIS — Z13.228 SCREENING FOR ENDOCRINE, NUTRITIONAL, METABOLIC AND IMMUNITY DISORDER: ICD-10-CM

## 2019-07-30 DIAGNOSIS — F41.9 ANXIETY AND DEPRESSION: ICD-10-CM

## 2019-07-30 DIAGNOSIS — Z01.419 WELL WOMAN EXAM WITH ROUTINE GYNECOLOGICAL EXAM: Primary | ICD-10-CM

## 2019-07-30 DIAGNOSIS — Z13.29 SCREENING FOR ENDOCRINE, NUTRITIONAL, METABOLIC AND IMMUNITY DISORDER: ICD-10-CM

## 2019-07-30 DIAGNOSIS — Z13.21 SCREENING FOR ENDOCRINE, NUTRITIONAL, METABOLIC AND IMMUNITY DISORDER: ICD-10-CM

## 2019-07-30 DIAGNOSIS — Z12.4 SCREENING FOR MALIGNANT NEOPLASM OF CERVIX: ICD-10-CM

## 2019-07-30 DIAGNOSIS — F32.A ANXIETY AND DEPRESSION: ICD-10-CM

## 2019-07-30 DIAGNOSIS — Z13.0 SCREENING FOR ENDOCRINE, NUTRITIONAL, METABOLIC AND IMMUNITY DISORDER: ICD-10-CM

## 2019-07-30 DIAGNOSIS — Z11.4 SCREENING FOR HIV WITHOUT PRESENCE OF RISK FACTORS: ICD-10-CM

## 2019-07-30 RX ORDER — TRAZODONE HYDROCHLORIDE 50 MG/1
TABLET ORAL
Refills: 0 | COMMUNITY
Start: 2019-06-10

## 2019-07-30 RX ORDER — SERTRALINE HYDROCHLORIDE 50 MG/1
TABLET, FILM COATED ORAL
Refills: 0 | COMMUNITY
Start: 2019-06-10 | End: 2019-07-30

## 2019-07-30 RX ORDER — DROSPIRENONE AND ETHINYL ESTRADIOL 0.02-3(28)
KIT ORAL
Refills: 2 | COMMUNITY
Start: 2019-05-15 | End: 2020-12-10

## 2019-07-30 RX ORDER — HYDROXYZINE 25 MG/1
TABLET, FILM COATED ORAL
Refills: 0 | COMMUNITY
Start: 2019-06-10

## 2019-07-30 RX ORDER — SERTRALINE HYDROCHLORIDE 25 MG/1
25 TABLET, FILM COATED ORAL DAILY
Qty: 30 TAB | Refills: 0 | Status: SHIPPED | OUTPATIENT
Start: 2019-07-30

## 2019-07-30 NOTE — PROGRESS NOTES
Pt is here for   Chief Complaint   Patient presents with    Well Woman     pap    Depression     1. Have you been to the ER, urgent care clinic since your last visit? Hospitalized since your last visit? No    2. Have you seen or consulted any other health care providers outside of the [address] [place] [address] since your last visit? Include any pap smears or colon screening.  No    Pt denies pain at this time

## 2019-07-30 NOTE — PROGRESS NOTES
Subjective:   34 y.o. female for Well Woman Check. Patient's last menstrual period was 07/23/2019. Social History: single partner, contraception - OCP (Oral Contraceptive Pills). Pertinent past medical hstory: no history of HTN, DVT, CAD, DM, liver disease, migraines or smoking. Patient is seen for followup of depression, seeing psych now. Treatment includes Zoloft and hydroxyzine. Feels hydroxyzine helpful, but only \"bites a little piece\" of Zoloft at times since   \"50 mg\" is \"a lot\". Feels it makes her feel \"different\". No other therapies currently. Will return in late August.   The patient denies recurrent thoughts of death and suicidal thoughts without plan. The patient experiences the following side effects from the treatment: none.   3 most recent PHQ Screens 7/30/2019   Little interest or pleasure in doing things More than half the days   Feeling down, depressed, irritable, or hopeless Several days   Total Score PHQ 2 3   Trouble falling or staying asleep, or sleeping too much Several days   Feeling tired or having little energy Several days   Poor appetite, weight loss, or overeating Several days   Feeling bad about yourself - or that you are a failure or have let yourself or your family down Several days   Trouble concentrating on things such as school, work, reading, or watching TV Nearly every day   Moving or speaking so slowly that other people could have noticed; or the opposite being so fidgety that others notice Nearly every day   Thoughts of being better off dead, or hurting yourself in some way Not at all   PHQ 9 Score 13   How difficult have these problems made it for you to do your work, take care of your home and get along with others Somewhat difficult           Patient Active Problem List   Diagnosis Code    Cannabis abuse F12.10    Episodic memory loss R41.3    Mood changes R45.86    Asthma J45.909     Patient Active Problem List    Diagnosis Date Noted    Asthma 11/07/2018    Mood changes 05/31/2017    Episodic memory loss 05/26/2016    Cannabis abuse 05/10/2016     Current Outpatient Medications   Medication Sig Dispense Refill    drospirenone-ethinyl estradiol (MARCOS) 3-0.02 mg tab TK 1 T PO QD  2    hydrOXYzine HCl (ATARAX) 25 mg tablet TK 1 T PO  BID PRN FOR ANXIETY  0    sertraline (ZOLOFT) 50 mg tablet TK 1 T PO  QAM  0    traZODone (DESYREL) 50 mg tablet TK 1 T PO  QHS  0    citalopram (CELEXA) 20 mg tablet Take 1/2 tablet by mouth daily x 2 weeks and then take 1 whole tablet by mouth. 30 Tab 1     No Known Allergies  Past Medical History:   Diagnosis Date    B[facility] ([facility] [address] [facility]     Schizophrenia, schizo-affective ([facility] [address]      No past surgical history on file. Family History   Problem Relation Age of Onset    Hypertension Mother     Cancer Father     Hypertension Maternal Grandmother     Hypertension Maternal Grandfather      Social History     Tobacco Use    Smoking status: Current Some Day Smoker     Packs/day: 2.00     Types: Cigarettes    Smokeless tobacco: Never Used   Substance Use Topics    Alcohol use: No     Alcohol/week: 0.0 standard drinks        ROS:  Feeling well. No dyspnea or chest pain on exertion. No abdominal pain, change in bowel habits, black or bloody stools. No urinary tract symptoms. GYN ROS: normal menses, no abnormal bleeding, pelvic pain or discharge, no breast pain or new or enlarging lumps on self exam, she complains of depressed mood. No neurological complaints. Objective:     Visit Vitals  /68 (BP 1 Location: Left arm, BP Patient Position: Sitting)   Pulse 71   Temp 98.7 °F (37.1 °C) (Oral)   Resp 17   Ht 5' 2\" (1.575 m)   Wt 164 lb (74.4 kg)   LMP 07/23/2019   SpO2 97%   BMI 30.00 kg/m²     The patient appears well, alert, oriented x 3, in no distress. ENT normal.  Neck supple. No adenopathy or thyromegaly. YONATHAN. Lungs are clear, good air entry, no wheezes, rhonchi or rales.  S1 and S2 normal, no murmurs, regular rate and rhythm. Abdomen soft without tenderness, guarding, mass or organomegaly. Extremities show no edema, normal peripheral pulses. Neurological is normal, no focal findings. BREAST EXAM: breasts appear normal, no suspicious masses, no skin or nipple changes or axillary nodes    PELVIC EXAM: normal external genitalia, vulva, vagina, cervix, uterus and adnexa, THIN YELLOW DISCHARGE NOTED. SENT FOR NUSWAB. PAP: Pap smear done today, thin-prep method    Assessment/Plan:   well woman  pap smear  counseled on breast self exam, STD prevention, HIV risk factors and prevention and family planning choices  additional lab tests per orders  return annually or prn  MDD- lowered zoloft dose to 25 mg to IMPROVE compliance and DAILY USE advised. Otherwise deferring med mgt to [facility]. ICD-10-CM ICD-9-CM    1. Well woman exam with routine gynecological exam Z01.419 V72.31 PAP, LB, RFX HPV LTWCL([id number])      HIV 1/2 AG/AB, 4TH GENERATION,W RFLX CONFIRM      METABOLIC PANEL, COMPREHENSIVE      CBC WITH AUTOMATED DIFF      HEMOGLOBIN A1C WITH EAG      LIPID PANEL      TSH 3RD GENERATION      NUSWAB VAGINITIS PLUS    [V72.31]   2. Screening for malignant neoplasm of cervix Z12.4 V76.2 PAP, LB, RFX HPV WPQCK([id number])      NUSWAB VAGINITIS PLUS   3. Anxiety and depression F41.9 300.00 sertraline (ZOLOFT) 25 mg tablet    F32.9 311    4. Screening for HIV without presence of risk factors Z11.4 V73.89 HIV 1/2 AG/AB, 4TH GENERATION,W RFLX CONFIRM   5. Screening for endocrine, nutritional, metabolic and immunity disorder Y93.76 H08.06 METABOLIC PANEL, COMPREHENSIVE    Z13.21  CBC WITH AUTOMATED DIFF    Z13.228  HEMOGLOBIN A1C WITH EAG    Z13.0  TSH 3RD GENERATION   6. Screening for lipid disorders Z13.220 V77.91 LIPID PANEL   .

## 2019-07-30 NOTE — PATIENT INSTRUCTIONS
Recovering From Depression: Care Instructions  Your Care Instructions    Taking good care of yourself is important as you recover from depression. In time, your symptoms will fade as your treatment takes hold. Do not give up. Instead, focus your energy on getting better. Your mood will improve. It just takes some time. Focus on things that can help you feel better, such as being with friends and family, eating well, and getting enough rest. But take things slowly. Do not do too much too soon. You will begin to feel better gradually. Follow-up care is a key part of your treatment and safety. Be sure to make and go to all appointments, and call your doctor if you are having problems. It's also a good idea to know your test results and keep a list of the medicines you take. How can you care for yourself at home? Be realistic  · If you have a large task to do, break it up into smaller steps you can handle, and just do what you can. · You may want to put off important decisions until your depression has lifted. If you have plans that will have a major impact on your life, such as marriage, divorce, or a job change, try to wait a bit. Talk it over with friends and loved ones who can help you look at the overall picture first.  · Reaching out to people for help is important. Do not isolate yourself. Let your family and friends help you. Find someone you can trust and confide in, and talk to that person. · Be patient, and be kind to yourself. Remember that depression is not your fault and is not something you can overcome with willpower alone. Treatment is necessary for depression, just like for any other illness. Feeling better takes time, and your mood will improve little by little. Stay active  · Stay busy and get outside. Take a walk, or try some other light exercise. · Talk with your doctor about an exercise program. Exercise can help with mild depression. · Go to a movie or concert.  Take part in a Religious activity or other social gathering. Go to a [employer] game. · Ask a friend to have dinner with you. Take care of yourself  · Eat a balanced diet with plenty of fresh fruits and vegetables, whole grains, and lean protein. If you have lost your appetite, eat small snacks rather than large meals. · Avoid drinking alcohol or using illegal drugs. Do not take medicines that have not been prescribed for you. They may interfere with medicines you may be taking for depression, or they may make your depression worse. · Take your medicines exactly as they are prescribed. You may start to feel better within 1 to 3 weeks of taking antidepressant medicine. But it can take as many as 6 to 8 weeks to see more improvement. If you have questions or concerns about your medicines, or if you do not notice any improvement by 3 weeks, talk to your doctor. · If you have any side effects from your medicine, tell your doctor. Antidepressants can make you feel tired, dizzy, or nervous. Some people have dry mouth, constipation, headaches, sexual problems, or diarrhea. Many of these side effects are mild and will go away on their own after you have been taking the medicine for a few weeks. Some may last longer. Talk to your doctor if side effects are bothering you too much. You might be able to try a different medicine. · Get enough sleep. If you have problems sleeping:  ? Go to bed at the same time every night, and get up at the same time every morning. ? Keep your bedroom dark and quiet. ? Do not exercise after 5:00 p.m.  ? Avoid drinks with caffeine after 5:00 p.m. · Avoid sleeping pills unless they are prescribed by the doctor treating your depression. Sleeping pills may make you groggy during the day, and they may interact with other medicine you are taking. · If you have any other illnesses, such as diabetes, heart disease, or high blood pressure, make sure to continue with your treatment.  Tell your doctor about all of the medicines you take, including those with or without a prescription. · Keep the numbers for these national suicide hotlines: 9-236-527-TALK (3-372.245.2729) and 5-432-HREBKYR (8-987.126.6680). If you or someone you know talks about suicide or feeling hopeless, get help right away. When should you call for help? Call 911 anytime you think you may need emergency care. For example, call if:    · You feel like hurting yourself or someone else.     · Someone you know has depression and is about to attempt or is attempting suicide.   [facility] your doctor now or seek immediate medical care if:    · You hear voices.     · Someone you know has depression and:  ? Starts to give away his or her possessions. ? Uses illegal drugs or drinks alcohol heavily. ? Talks or writes about death, including writing suicide notes or talking about guns, knives, or pills. ? Starts to spend a lot of time alone. ? Acts very aggressively or suddenly appears calm.    Watch closely for changes in your health, and be sure to contact your doctor if:    · You do not get better as expected. Where can you learn more? Go to http://hema-arron.info/. Enter L784 in the search box to learn more about \"Recovering From Depression: Care Instructions. \"  Current as of: September 11, 2018  Content Version: 12.1  © 2514-4063 Healthwise, Incorporated. Care instructions adapted under license by [employer] (which disclaims liability or warranty for this information). If you have questions about a medical condition or this instruction, always ask your healthcare professional. [address] any warranty or liability for your use of this information. Learning About Mood Disorders  What are mood disorders? Mood disorders are medical problems that affect how you feel. They can impact your moods, thoughts, and actions. Mood disorders include:  · Depression.  This causes you to feel sad or hopeless for much of the time.  · Bipolar disorder. This causes extreme mood changes from manic episodes of very high energy to extreme lows of depression. · Seasonal affective disorder (SAD). This is a type of depression that affects you during the same season each year. Most often people experience SAD during the fall and winter months when days are shorter and there is less light. What are the symptoms? Depression  You may:  · Feel sad or hopeless nearly every day. · Lose interest in or not get pleasure from most daily activities. You feel this way nearly every day. · Have low energy, changes in your appetite, or changes in how well you sleep. · Have trouble concentrating. · Think about death and suicide. Keep the numbers for these national suicide hotlines: 8-613-597-TALK (6-603.869.1618) and 8-735-FGYVTXV (5-552.823.8965). If you or someone you know talks about suicide or feeling hopeless, get help right away. Bipolar disorder  Symptoms depend on your mood swings. You may:  · Feel very happy, energetic, or on edge. · Feel like you need very little sleep. · Feel overly self-confident. · Do impulsive things, such as spending a lot of money. · Feel sad or hopeless. · Have racing thoughts or trouble thinking and making decisions. · Lose interest in things you have enjoyed in the past.  · Think about death and suicide. Keep the numbers for these national suicide hotlines: 8-111-196-TALK (2-857.547.5119) and 0-750-QSHLIAJ (4-209.610.8751). If you or someone you know talks about suicide or feeling hopeless, get help right away. Seasonal affective disorder (SAD)  Symptoms come and go at about the same time each year. For most people with SAD, symptoms come during the winter when there is less daylight. You may:  · Feel sad, grumpy, ayala, or anxious. · Lose interest in your usual activities. · Eat more and crave carbohydrates, such as bread and pasta. · Gain weight. · Sleep more and feel drowsy during the daytime.   How are mood disorders treated? Mood disorders can be treated with medicines or counseling, or a combination of both. Medicines for depression and SAD may include antidepressants. Medicines for bipolar disorder may include:  · Mood stabilizers. · Antipsychotics. · Benzodiazepines. Counseling may involve cognitive-behavioral therapy. It teaches you how to change the ways you think and behave. This can help you stop thinking bad thoughts about yourself and your life. Light therapy is the main treatment for SAD. This therapy uses a special kind of lamp. You let the lamp shine on you at certain times, usually in the morning. This may help your symptoms during the months when there is less sunlight. Healthy lifestyle  Healthy lifestyle changes may help you feel better. · Get at least 30 minutes of exercise on most days of the week. Walking is a good choice. · Eat a healthy diet. Include fruits, vegetables, lean proteins, and whole grains in your diet each day. · Keep a regular sleep schedule. Try for 8 hours of sleep a night. · Find ways to manage stress, such as relaxation exercises. · Avoid alcohol and illegal drugs. Follow-up care is a key part of your treatment and safety. Be sure to make and go to all appointments, and call your doctor if you are having problems. It's also a good idea to know your test results and keep a list of the medicines you take. Where can you learn more? Go to http://hema-arron.info/. Enter N126 in the search box to learn more about \"Learning About Mood Disorders. \"  Current as of: September 11, 2018  Content Version: 12.1  © 7482-8988 Healthwise, Incorporated. Care instructions adapted under license by [employer] (which disclaims liability or warranty for this information).  If you have questions about a medical condition or this instruction, always ask your healthcare professional. [place] [address] any warranty or liability for your use of this information.

## 2019-07-31 LAB
ALBUMIN SERPL-MCNC: 4.4 G/DL (ref 3.5–5.5)
ALBUMIN/GLOB SERPL: 1.5 {RATIO} (ref 1.2–2.2)
ALP SERPL-CCNC: 56 IU/L (ref 39–117)
ALT SERPL-CCNC: 10 IU/L (ref 0–32)
AST SERPL-CCNC: 10 IU/L (ref 0–40)
BASOPHILS # BLD AUTO: 0 X10E3/UL (ref 0–0.2)
BASOPHILS NFR BLD AUTO: 1 %
BILIRUB SERPL-MCNC: 0.3 MG/DL (ref 0–1.2)
BUN SERPL-MCNC: 7 MG/DL (ref 6–20)
BUN/CREAT SERPL: 12 (ref 9–23)
CALCIUM SERPL-MCNC: 9.4 MG/DL (ref 8.7–10.2)
CHLORIDE SERPL-SCNC: 103 MMOL/L (ref 96–106)
CHOLEST SERPL-MCNC: 181 MG/DL (ref 100–199)
CO2 SERPL-SCNC: 24 MMOL/L (ref 20–29)
CREAT SERPL-MCNC: 0.57 MG/DL (ref 0.57–1)
EOSINOPHIL # BLD AUTO: 0.1 X10E3/UL (ref 0–0.4)
EOSINOPHIL NFR BLD AUTO: 3 %
ERYTHROCYTE [DISTWIDTH] IN BLOOD BY AUTOMATED COUNT: 12.8 % (ref 12.3–15.4)
EST. AVERAGE GLUCOSE BLD GHB EST-MCNC: 111 MG/DL
GLOBULIN SER CALC-MCNC: 3 G/DL (ref 1.5–4.5)
GLUCOSE SERPL-MCNC: 83 MG/DL (ref 65–99)
HBA1C MFR BLD: 5.5 % (ref 4.8–5.6)
HCT VFR BLD AUTO: 41.5 % (ref 34–46.6)
HDLC SERPL-MCNC: 49 MG/DL
HGB BLD-MCNC: 13.8 G/DL (ref 11.1–15.9)
HIV 1+2 AB+HIV1 P24 AG SERPL QL IA: NON REACTIVE
IMM GRANULOCYTES # BLD AUTO: 0 X10E3/UL (ref 0–0.1)
IMM GRANULOCYTES NFR BLD AUTO: 0 %
INTERPRETATION, 910389: NORMAL
LDLC SERPL CALC-MCNC: 107 MG/DL (ref 0–99)
LYMPHOCYTES # BLD AUTO: 2 X10E3/UL (ref 0.7–3.1)
LYMPHOCYTES NFR BLD AUTO: 43 %
MCH RBC QN AUTO: 31.4 PG (ref 26.6–33)
MCHC RBC AUTO-ENTMCNC: 33.3 G/DL (ref 31.5–35.7)
MCV RBC AUTO: 94 FL (ref 79–97)
MONOCYTES # BLD AUTO: 0.3 X10E3/UL (ref 0.1–0.9)
MONOCYTES NFR BLD AUTO: 6 %
NEUTROPHILS # BLD AUTO: 2.3 X10E3/UL (ref 1.4–7)
NEUTROPHILS NFR BLD AUTO: 47 %
PLATELET # BLD AUTO: 352 X10E3/UL (ref 150–450)
POTASSIUM SERPL-SCNC: 4.4 MMOL/L (ref 3.5–5.2)
PROT SERPL-MCNC: 7.4 G/DL (ref 6–8.5)
RBC # BLD AUTO: 4.4 X10E6/UL (ref 3.77–5.28)
SODIUM SERPL-SCNC: 142 MMOL/L (ref 134–144)
TRIGL SERPL-MCNC: 127 MG/DL (ref 0–149)
TSH SERPL DL<=0.005 MIU/L-ACNC: 1.62 UIU/ML (ref 0.45–4.5)
VLDLC SERPL CALC-MCNC: 25 MG/DL (ref 5–40)
WBC # BLD AUTO: 4.8 X10E3/UL (ref 3.4–10.8)

## 2019-08-01 LAB
CYTOLOGIST CVX/VAG CYTO: NORMAL
CYTOLOGY CVX/VAG DOC CYTO: NORMAL
DX ICD CODE: NORMAL
LABCORP, 190119: NORMAL
Lab: NORMAL
OTHER STN SPEC: NORMAL
STAT OF ADQ CVX/VAG CYTO-IMP: NORMAL

## 2019-08-01 NOTE — PROGRESS NOTES
You will NOT need another cervical cancer screening PAP for 3 years, however a yearly well woman visit is advised.

## 2019-08-02 ENCOUNTER — PATIENT MESSAGE (OUTPATIENT)
Dept: INTERNAL MEDICINE CLINIC | Age: 29
End: 2019-08-02

## 2019-08-02 LAB
A VAGINAE DNA VAG QL NAA+PROBE: ABNORMAL SCORE
BVAB2 DNA VAG QL NAA+PROBE: ABNORMAL SCORE
C ALBICANS DNA VAG QL NAA+PROBE: NEGATIVE
C GLABRATA DNA VAG QL NAA+PROBE: NEGATIVE
C TRACH RRNA SPEC QL NAA+PROBE: NEGATIVE
MEGA1 DNA VAG QL NAA+PROBE: ABNORMAL SCORE
N GONORRHOEA RRNA SPEC QL NAA+PROBE: NEGATIVE
T VAGINALIS RRNA SPEC QL NAA+PROBE: NEGATIVE

## 2019-08-05 DIAGNOSIS — B96.89 BV (BACTERIAL VAGINOSIS): Primary | ICD-10-CM

## 2019-08-05 DIAGNOSIS — N76.0 BV (BACTERIAL VAGINOSIS): Primary | ICD-10-CM

## 2019-08-05 RX ORDER — METRONIDAZOLE 500 MG/1
500 TABLET ORAL 2 TIMES DAILY
Qty: 14 TAB | Refills: 0 | Status: SHIPPED | OUTPATIENT
Start: 2019-08-05 | End: 2019-08-12

## 2019-08-05 NOTE — PROGRESS NOTES
Two patient identifier confirmed  Called and spoke w/ pt about vaginal swab result and treatment that was sent to pt's pharmacy on file.

## 2020-12-10 ENCOUNTER — HOSPITAL ENCOUNTER (EMERGENCY)
Age: 30
Discharge: HOME | End: 2020-12-10
Attending: EMERGENCY MEDICINE
Payer: COMMERCIAL

## 2020-12-10 VITALS
BODY MASS INDEX: 32.76 KG/M2 | HEIGHT: 62 IN | SYSTOLIC BLOOD PRESSURE: 135 MMHG | DIASTOLIC BLOOD PRESSURE: 73 MMHG | WEIGHT: 178 LBS | OXYGEN SATURATION: 97 % | HEART RATE: 68 BPM | RESPIRATION RATE: 18 BRPM | TEMPERATURE: 98.3 F

## 2020-12-10 DIAGNOSIS — K04.7 DENTAL ABSCESS: Primary | ICD-10-CM

## 2020-12-10 PROCEDURE — 74011250637 HC RX REV CODE- 250/637: Performed by: EMERGENCY MEDICINE

## 2020-12-10 PROCEDURE — 74011000250 HC RX REV CODE- 250: Performed by: EMERGENCY MEDICINE

## 2020-12-10 PROCEDURE — 99284 EMERGENCY DEPT VISIT MOD MDM: CPT

## 2020-12-10 RX ORDER — PENICILLIN V POTASSIUM 500 MG/1
500 TABLET, FILM COATED ORAL 4 TIMES DAILY
Qty: 40 TAB | Refills: 0 | Status: SHIPPED | OUTPATIENT
Start: 2020-12-10

## 2020-12-10 RX ORDER — PENICILLIN V POTASSIUM 250 MG/1
500 TABLET, FILM COATED ORAL
Status: COMPLETED | OUTPATIENT
Start: 2020-12-10 | End: 2020-12-10

## 2020-12-10 RX ADMIN — PENICILLIN V POTASSIUM 500 MG: 250 TABLET, FILM COATED ORAL at 04:40

## 2020-12-10 RX ADMIN — DIPHENHYDRAMINE HYDROCHLORIDE: 12.5 LIQUID ORAL at 04:40

## 2020-12-10 NOTE — DISCHARGE INSTRUCTIONS
Patient Education   Emergency [address] by TIFFANIE [facility]  [address] [facility], [address]  Open M, W, F: 8AM - 5PM and T, Th: 8AM-6PM  Phone: [phone], press 4  $70 for Emergency Care  $60 for first routine care, then pay by sliding scale based upon income. [facility]  [address] [place], [address]  Phone: [phone]    [employer]  [address] [facility], [address]  Open Monday - Friday 8AM - 4:30 PM  Phone: [address] [place] [address] Dentistry Urgent [address] [place] [address] Dentistry, [address] [facility], [facility], [facility] [address] [facility] starting at 8:30 AM M-F  Phone: [phone], press 2  Fee: $150 per tooth (x-ray & extractions only)  Pediatrics Phone[de-identified] [phone], 8-5 M-F    [address] Dentistry, [address] [facility], [facility] [address], 2nd Floor, [address] [place] [address] starting at 8:30 AM - 3 PM [address] [place] [address] St  [address] [facility], [address] [place] [address]  Phone: [phone] or [phone]  Emergency Hours: 9:30AM - 11AM (extractions)  Simple tooth extraction $ per tooth. #75 for x-ray    [facility] Residents only, over the age of 25  Phone: [phone]. Leave message saying you need an appointment to register. Hours: Tuesday Evenings       Abscessed Tooth: Care Instructions  Your Care Instructions     An abscessed tooth is a tooth that has a pocket of pus in the tissues around it. Pus forms when the body tries to fight an infection caused by bacteria. If the pus cannot drain, it forms an abscess. An abscessed tooth can cause red, swollen gums and throbbing pain, especially when you chew. You may have a bad taste in your mouth and a fever, and your jaw may swell. Damage to the tooth, untreated tooth decay, or gum disease can cause an abscessed tooth.   An abscessed tooth needs to be treated by a dental professional right away. If it is not treated, the infection could spread to other parts of your body. Your dentist will give you antibiotics to stop the infection. He or she may make a hole in the tooth or cut open (nataliia) the abscess inside your mouth so that the infection can drain, which should relieve your pain. You may need to have a root canal treatment, which tries to save your tooth by taking out the infected pulp and replacing it with a healing medicine and/or a filling. If these treatments do not work, your tooth may have to be removed. Follow-up care is a key part of your treatment and safety. Be sure to make and go to all appointments, and call your doctor if you are having problems. It's also a good idea to know your test results and keep a list of the medicines you take. How can you care for yourself at home? · Reduce pain and swelling in your face and jaw by putting ice or a cold pack on the outside of your cheek. Do this for 10 to 20 minutes at a time. Put a thin cloth between the ice and your skin. · Take pain medicines exactly as directed. ? If the doctor gave you a prescription medicine for pain, take it as prescribed. ? If you are not taking a prescription pain medicine, ask your doctor if you can take an over-the-counter medicine. · Take antibiotics as directed. Do not stop taking them just because you feel better. You need to take the full course of antibiotics. To prevent tooth abscess  · Brush and floss every day. Have regular dental checkups. · Eat a healthy diet. Avoid sugary foods and drinks. · Do not smoke or vape with nicotine. And don't use spit tobacco. Tobacco and nicotine slow your ability to heal. They increase your risk for gum disease and cancer of the mouth and throat. If you need help quitting, talk to your doctor about stop-smoking programs and medicines. These can increase your chances of quitting for good.   When should you call for help? Call 911 anytime you think you may need emergency care. For example, call if:    · You have trouble breathing. Call your doctor now or seek immediate medical care if:    · You have new or worse symptoms of infection, such as:  ? Increased pain, swelling, warmth, or redness. ? Red streaks leading from the area. ? Pus draining from the area. ? A fever. Watch closely for changes in your health, and be sure to contact your doctor if:    · You do not get better as expected. Where can you learn more? Go to http://www.gray.com/  Enter L466 in the search box to learn more about \"Abscessed Tooth: Care Instructions. \"  Current as of: March 25, 2020               Content Version: 12.6  © 4740-5218 [employer]. Care instructions adapted under license by [employer] (which disclaims liability or warranty for this information). If you have questions about a medical condition or this instruction, always ask your healthcare professional. [place] [address] any warranty or liability for your use of this information.

## 2020-12-10 NOTE — ED PROVIDER NOTES
26-year-old female with a history of asthma, bipolar, schizophrenia presents with right upper and lower dental pain for the last 3 weeks. Pain is worse with hot, cold, chewing. She has tried Excedrin, Tylenol, BC powder without relief. Denies swelling or fevers. Patient is status post wisdom teeth extraction on that side. Pain is at the site where her wisdom teeth were. Past Medical History:   Diagnosis Date    Asthma     [facility] ([facility]     [facility]        History reviewed. No pertinent surgical history. Family History:   Problem Relation Age of Onset    Hypertension Mother     Cancer Father     Hypertension Maternal Grandmother     Hypertension Maternal Grandfather        Social History     Socioeconomic History    Marital status: SINGLE     Spouse name: Not on file    Number of children: Not on file    Years of education: Not on file    Highest education level: Not on file   Occupational History    Not on file   Social Needs    Financial resource strain: Not on file    Food insecurity     Worry: Not on file     Inability: Not on file    Transportation needs     Medical: Not on file     Non-medical: Not on file   Tobacco Use    Smoking status: Current Some Day Smoker     Packs/day: 2.00     Types: Cigarettes    Smokeless tobacco: Never Used   Substance and Sexual Activity    Alcohol use:  Yes     Alcohol/week: 0.0 standard drinks     Comment: occasionally    Drug use: Yes     Frequency: 2.0 times per week     Types: Marijuana     Comment: occasionally    Sexual activity: Yes     Partners: Male     Birth control/protection: None   Lifestyle    Physical activity     Days per week: Not on file     Minutes per session: Not on file    Stress: Not on file   Relationships    Social connections     Talks on phone: Not on file     Gets together: Not on file     Attends Mandaen service: Not on file     Active member of club or organization: Not on file     Attends meetings of clubs or organizations: Not on file     Relationship status: Not on file    Intimate partner violence     Fear of current or ex partner: Not on file     Emotionally abused: Not on file     Physically abused: Not on file     Forced sexual activity: Not on file   Other Topics Concern    Not on file   Social History Narrative    Not on file         ALLERGIES: Patient has no known allergies. Review of Systems   Constitutional: Negative. Negative for chills, fever and unexpected weight change. HENT: Positive for dental problem. Negative for congestion and trouble swallowing. Eyes: Negative for discharge. Respiratory: Negative. Negative for cough, chest tightness and shortness of breath. Cardiovascular: Negative. Negative for chest pain. Gastrointestinal: Negative. Negative for abdominal distention, abdominal pain, constipation, diarrhea and nausea. Endocrine: Negative. Genitourinary: Negative. Negative for difficulty urinating, dysuria, frequency and urgency. Musculoskeletal: Negative. Negative for arthralgias and myalgias. Skin: Negative. Negative for color change. Allergic/Immunologic: Negative. Neurological: Negative. Negative for dizziness, speech difficulty and headaches. Hematological: Negative. Psychiatric/Behavioral: Negative. Negative for agitation and confusion. All other systems reviewed and are negative. Vitals:    12/10/20 0424 12/10/20 0431   BP: (!) 140/85    Pulse: 68    Resp: 18    Temp: 98.3 °F (36.8 °C)    SpO2: 97% 97%   Weight: 80.7 kg (178 lb)    Height: 5' 2\" (1.575 m)             Physical Exam  Vitals signs and nursing note reviewed. Constitutional:       Appearance: She is well-developed. HENT:      Head: Normocephalic and atraumatic. Mouth/Throat:      Pharynx: Oropharyngeal exudate: tenderness. Comments: Tenderness to palpation at gumline at site of former wisdom tooth.   No drainable fluid collection  Eyes:      Conjunctiva/sclera: Conjunctivae normal.   Neck:      Musculoskeletal: Neck supple. Cardiovascular:      Rate and Rhythm: Normal rate and regular rhythm. Pulmonary:      Effort: Pulmonary effort is normal. No respiratory distress. Abdominal:      Palpations: Abdomen is soft. Tenderness: There is no abdominal tenderness. Musculoskeletal: Normal range of motion. General: No deformity. Skin:     General: Skin is warm and dry. Neurological:      Mental Status: She is alert and oriented to person, place, and time. Psychiatric:         Behavior: Behavior normal.         Thought Content: Thought content normal.          MDM       Procedures        LABORATORY TESTS:  No results found for this or any previous visit (from the past 12 hour(s)). IMAGING RESULTS:  No orders to display       MEDICATIONS GIVEN:  Medications   dental ball (lidocaine/Benadryl/Cetacaine) mixture ( Mucous Membrane Given 12/10/20 0440)   penicillin v potassium (VEETID) tablet 500 mg (500 mg Oral Given 12/10/20 0440)       IMPRESSION:  1. Dental abscess        PLAN:  1. Discharge Medication List as of 12/10/2020  4:56 AM      START taking these medications    Details   penicillin v potassium (VEETID) 500 mg tablet Take 1 Tab by mouth four (4) times daily. , Print, Disp-40 Tab,R-0         CONTINUE these medications which have NOT CHANGED    Details   hydrOXYzine HCl (ATARAX) 25 mg tablet TK 1 T PO  BID PRN FOR ANXIETY, Historical Med, R-0      sertraline (ZOLOFT) 25 mg tablet Take 1 Tab by mouth daily. , Normal, Disp-30 Tab, R-0      traZODone (DESYREL) 50 mg tablet TK 1 T PO  QHS, Historical Med, R-0           2.    Follow-up Information     Follow up With Specialties Details Why Contact Info    [facility] of your choice            Return to ED if worse

## 2020-12-11 ENCOUNTER — PATIENT OUTREACH (OUTPATIENT)
Dept: CASE MANAGEMENT | Age: 30
End: 2020-12-11

## 2020-12-11 NOTE — PROGRESS NOTES
Patient contacted regarding recent discharge and COVID-19 risk. Discussed COVID-19 related testing which was not done at this time. Test results were not done. Patient informed of results, if available? N/A      Care Transition Nurse/ Ambulatory Care Manager/ LPN Care Coordinator contacted the patient by telephone to perform post discharge assessment. Verified name and  with patient as identifiers. Patient has following risk factors of: asthma. CTN/ACM/LPN reviewed discharge instructions, medical action plan and red flags related to discharge diagnosis. Reviewed and educated them on any new and changed medications related to discharge diagnosis; Rx - veetid. Advised obtaining a 90-day supply of all daily and as-needed medications. Advance Care Planning:   Does patient have an Advance Directive: currently not on file; patient declined education    Education provided regarding infection prevention, and signs and symptoms of COVID-19 and when to seek medical attention with patient who verbalized understanding. Discussed exposure protocols and quarantine from [address] you at higher risk for severe illness  and given an opportunity for questions and concerns. The patient agrees to contact the COVID-19 hotline [phone] or PCP office for questions related to their healthcare. CTN/ACM/LPN provided contact information for future reference. From CDC: Are you at higher risk for severe illness?  Wash your hands often.  Avoid close contact (6 feet, which is about two arm lengths) with people who are sick.  Put distance between yourself and other people if COVID-19 is spreading in your community.  Clean and disinfect frequently touched surfaces.  Avoid all cruise travel and non-essential air travel.  Call your healthcare professional if you have concerns about COVID-19 and your underlying condition or if you are sick.     For more information on steps you can take to protect yourself, see CDC's How to Protect Yourself      Patient/family/caregiver given information for [facility] Loop and agrees to enroll no  Patient's preferred e-mail:  N/A  Patient's preferred phone number: N/A  Based on Loop alert triggers, patient will be contacted by nurse care manager for worsening symptoms. Pt was advised to f/u with a dentist post-discharge. ACM reminded patient today of recommended outpatient follow-up; pt verbalizes understanding and reports plan to contact Riky Andre today. Plan for follow-up call in 7-14 days based on severity of symptoms and risk factors.

## 2021-01-13 ENCOUNTER — PATIENT OUTREACH (OUTPATIENT)
Dept: CASE MANAGEMENT | Age: 31
End: 2021-01-13

## 2021-01-13 NOTE — PROGRESS NOTES
Patient resolved from Transition of Care episode on 1/13/2021 . ACM/CTN was unsuccessful at contacting this patient today. Patient/family was provided the following resources and education related to COVID-19 during the initial call:                         Signs, symptoms and red flags related to COVID-19            CDC exposure and quarantine guidelines            [facility] exposure contact - [phone]            Contact for their local Department of Health                 Patient has not had any additional ED or hospital visits. No further outreach scheduled with this CTN/ACM. Episode of Care resolved. Patient has this CTN/ACM contact information if future needs arise.

## 2022-03-19 PROBLEM — J45.909 ASTHMA: Status: ACTIVE | Noted: 2018-11-07

## 2022-03-19 PROBLEM — R45.86 MOOD CHANGES: Status: ACTIVE | Noted: 2017-05-31

## 2023-01-09 ENCOUNTER — OFFICE VISIT (OUTPATIENT)
Dept: INTERNAL MEDICINE CLINIC | Age: 33
End: 2023-01-09
Payer: COMMERCIAL

## 2023-01-09 VITALS
HEIGHT: 62 IN | SYSTOLIC BLOOD PRESSURE: 104 MMHG | TEMPERATURE: 97.9 F | WEIGHT: 151 LBS | BODY MASS INDEX: 27.79 KG/M2 | DIASTOLIC BLOOD PRESSURE: 58 MMHG | OXYGEN SATURATION: 99 % | HEART RATE: 52 BPM | RESPIRATION RATE: 18 BRPM

## 2023-01-09 DIAGNOSIS — L20.84 INTRINSIC ECZEMA: ICD-10-CM

## 2023-01-09 DIAGNOSIS — Z20.2 POTENTIAL EXPOSURE TO STD: ICD-10-CM

## 2023-01-09 DIAGNOSIS — Z11.4 SCREENING FOR HIV WITHOUT PRESENCE OF RISK FACTORS: ICD-10-CM

## 2023-01-09 DIAGNOSIS — Z86.59 HISTORY OF BIPOLAR DISORDER: ICD-10-CM

## 2023-01-09 DIAGNOSIS — Z11.59 NEED FOR HEPATITIS C SCREENING TEST: ICD-10-CM

## 2023-01-09 DIAGNOSIS — R10.30 LOWER ABDOMINAL PAIN: ICD-10-CM

## 2023-01-09 DIAGNOSIS — J45.41 MODERATE PERSISTENT ASTHMA WITH ACUTE EXACERBATION: Primary | ICD-10-CM

## 2023-01-09 PROBLEM — R45.86 MOOD CHANGES: Status: RESOLVED | Noted: 2017-05-31 | Resolved: 2023-01-09

## 2023-01-09 PROCEDURE — 96372 THER/PROPH/DIAG INJ SC/IM: CPT | Performed by: NURSE PRACTITIONER

## 2023-01-09 PROCEDURE — 99204 OFFICE O/P NEW MOD 45 MIN: CPT | Performed by: NURSE PRACTITIONER

## 2023-01-09 RX ORDER — TRIAMCINOLONE ACETONIDE 1 MG/G
OINTMENT TOPICAL 2 TIMES DAILY
Qty: 30 G | Refills: 0 | Status: SHIPPED | OUTPATIENT
Start: 2023-01-09

## 2023-01-09 RX ORDER — ALBUTEROL SULFATE 90 UG/1
2 AEROSOL, METERED RESPIRATORY (INHALATION)
Qty: 1 EACH | Refills: 1 | Status: SHIPPED | OUTPATIENT
Start: 2023-01-09

## 2023-01-09 RX ORDER — FLUTICASONE PROPIONATE 110 UG/1
1 AEROSOL, METERED RESPIRATORY (INHALATION) EVERY 12 HOURS
Qty: 12 G | Refills: 2 | Status: SHIPPED | OUTPATIENT
Start: 2023-01-09

## 2023-01-09 RX ORDER — CEFTRIAXONE 250 MG/8ML
500 INJECTION, POWDER, FOR SOLUTION INTRAMUSCULAR; INTRAVENOUS ONCE
Qty: 500 MG | Refills: 0
Start: 2023-01-09 | End: 2023-01-09

## 2023-01-09 NOTE — PATIENT INSTRUCTIONS
Avoid hot showers and pat dry. Use BLACK SOAP OR Dove/Aveeno soap. Apply vaseline like ointment (Eucerin or Gold Bond diabetic) to affected areas or raw shea butter blended with oil (flaxseed, olive, or coconut). Only use steroids when you have redness and itching at the first sign (start with HYDROCORTISONE over the counter FIRST, then prescription strength if available). Stop steroids once itching and redness resolve, and continue using vaseline like moisturizer. Take Antihistamines as prescribed. Try using your inhaler for persistent coughing, wheezing, or shortness of breath. If you begin to need your inhaler more than twice weekly consistently, please return to the office to begin a new inhaler to better manage your asthma. Avoid extremes in temperature and known allergens.

## 2023-01-09 NOTE — PROGRESS NOTES
Mir Madison is a 29 y.o. female  HIPAA verified by two patient identifiers. Health Maintenance Due   Topic    Hepatitis C Screening     Depression Screen     COVID-19 Vaccine (1)    Pneumococcal 0-64 years (1 - PCV)    DTaP/Tdap/Td series (1 - Tdap)    Flu Vaccine (1)     Chief Complaint   Patient presents with    New Patient     Visit Vitals  BP (!) 104/58   Pulse (!) 52   Temp 97.9 °F (36.6 °C) (Oral)   Resp 18   Ht 5' 2\" (1.575 m)   Wt 151 lb (68.5 kg)   LMP 12/29/2022   SpO2 99%   BMI 27.62 kg/m²       Pain Scale: 0 - No pain/10  Pain Location:   1. Have you been to the ER, urgent care clinic since your last visit? Hospitalized since your last visit? No    2. Have you seen or consulted any other health care providers outside of the [address] [place] [address] since your last visit? Include any pap smears or colon screening.  No

## 2023-01-09 NOTE — PROGRESS NOTES
Maryann Harris (: 1990) is a 29 y.o. female, new patient, here for evaluation of the following chief complaint(s):  New Patient       ASSESSMENT/PLAN:  Below is the assessment and plan developed based on review of pertinent history, physical exam, labs, studies, and medications. 1. Moderate persistent asthma with acute exacerbation  -     fluticasone propionate (FLOVENT HFA) 110 mcg/actuation inhaler; Take 1 Puff by inhalation every twelve (12) hours. , Normal, Disp-12 g, R-2  -     albuterol (PROVENTIL HFA, VENTOLIN HFA, PROAIR HFA) 90 mcg/actuation inhaler; Take 2 Puffs by inhalation every four (4) hours as needed for Wheezing or Shortness of Breath (persistent coughing). , Normal, Disp-1 Each, R-1  2. Lower abdominal pain  -     NUSWAB VAGINITIS PLUS; Future  -     METABOLIC PANEL, COMPREHENSIVE  -     CBC WITH AUTOMATED DIFF  -     URINALYSIS W/ REFLEX CULTURE; Future  3. Potential exposure to STD  -     NUSWAB VAGINITIS PLUS; Future  -     T PALLIDUM SCREEN W/REFLEX  -     HIV 1/2 AG/AB, 4TH GENERATION,W RFLX CONFIRM  -     HCV RNA BY PCR W/REFL GENOTYPE  -     URINALYSIS W/ REFLEX CULTURE; Future  -     cefTRIAXone (ROCEPHIN) 250 mg injection; 500 mg by IntraMUSCular route once for 1 dose., No Print, Disp-500 mg, R-0  -     CEFTRIAXONE SODIUM INJECTION  MG  -     OR THERAPEUTIC PROPHYLACTIC/DX INJECTION SUBQ/IM  4. Need for hepatitis C screening test  -     HCV RNA BY PCR W/REFL GENOTYPE  5. Screening for HIV without presence of risk factors  -     HIV 1/2 AG/AB, 4TH GENERATION,W RFLX CONFIRM  6. History of bipolar disorder  -     TSH 3RD GENERATION  -     VITAMIN D, 25 HYDROXY; Future  7. Intrinsic eczema  -     triamcinolone acetonide (KENALOG) 0.1 % ointment; Apply  to affected area two (2) times a day. use thin layer, Normal, Disp-30 g, R-0    Return in about 4 weeks (around 2023) for OV-4 WK Asthma, lab review,.         Pt asked to complete follow by next visit: call if any problems, be srue to have PROTECTED SEX moving forward. INI see GYN for pelvic pain, treated with ROCEPHIN otherwise today. Will await results to treat for any other STDs. ICS started with QID BELLO use reported     SUBJECTIVE/OBJECTIVE:  HPI    Pt here to re-establish care, last seen 2019. Has concern for asthma or history of bipolar depression with anxiety. Followed by psych, taking hydroxyzine and zoloft. Has sedation at time with taking. 3 most recent PHQ Screens 1/9/2023   Little interest or pleasure in doing things Not at all   Feeling down, depressed, irritable, or hopeless Not at all   Total Score PHQ 2 0   Trouble falling or staying asleep, or sleeping too much -   Feeling tired or having little energy -   Poor appetite, weight loss, or overeating -   Feeling bad about yourself - or that you are a failure or have let yourself or your family down -   Trouble concentrating on things such as school, work, reading, or watching TV -   Moving or speaking so slowly that other people could have noticed; or the opposite being so fidgety that others notice -   Thoughts of being better off dead, or hurting yourself in some way -   PHQ 9 Score -   How difficult have these problems made it for you to do your work, take care of your home and get along with others -     Asthma Review:  The patient is being seen for asthma symptoms,  currently stable. Asthma symptoms occur: infrequently. Wheezing when present is described as mild and easily relieved with rescue bronchodilator. The patient reports use of a steroid inhaler. Frequency of use of quick-relief meds: rarely. Regimen compliance: The patient reports adherence to this regimen. ACT score: 7    Also concerned for STD. Had unprotected sex ~ 3 weeks ago. Associated with lower abdominal pain. No dysuria, vaginal spotting, irritation or discharge.      Review of Systems  Constitutional: negative for fevers, chills, anorexia and weight loss  Respiratory:  negative for cough, hemoptysis, dyspnea, and wheezing  CV:   negative for chest pain, palpitations, and lower extremity edema  GI:   negative for nausea, vomiting, diarrhea, abdominal pain, and melena  Endo:               negative for polyuria,polydipsia,polyphagia, and heat intolerance  Genitourinary: negative for frequency, urgency, dysuria, retention, and hematuria  Integument:  negative for rash, ulcerations, and pruritus  Hematologic:  negative for easy bruising and bleeding  Musculoskel: negative for arthralgias, muscle weakness,and joint pain/swelling  Neurological:  negative for headaches, dizziness, vertigo,and memory/gait problems  Behavl/Psych: negative for feelings of anxiety, depression, suicide, and mood changes    Past Medical History:   Diagnosis Date    Asthma     Bipolar [facility] disorder ([facility]     [facility]        History reviewed. No pertinent surgical history. Current Outpatient Medications   Medication Sig    fluticasone propionate (FLOVENT HFA) 110 mcg/actuation inhaler Take 1 Puff by inhalation every twelve (12) hours. albuterol (PROVENTIL HFA, VENTOLIN HFA, PROAIR HFA) 90 mcg/actuation inhaler Take 2 Puffs by inhalation every four (4) hours as needed for Wheezing or Shortness of Breath (persistent coughing). cefTRIAXone (ROCEPHIN) 250 mg injection 500 mg by IntraMUSCular route once for 1 dose.    hydrOXYzine HCl (ATARAX) 25 mg tablet TK 1 T PO  BID PRN FOR ANXIETY    sertraline (ZOLOFT) 25 mg tablet Take 1 Tab by mouth daily. triamcinolone acetonide (KENALOG) 0.1 % ointment Apply  to affected area two (2) times a day. use thin layer     No current facility-administered medications for this visit.        Visit Vitals  BP (!) 104/58   Pulse (!) 52   Temp 97.9 °F (36.6 °C) (Oral)   Resp 18   Ht 5' 2\" (1.575 m)   Wt 151 lb (68.5 kg)   LMP 12/29/2022   SpO2 99%   BMI 27.62 kg/m²       Wt Readings from Last 3 Encounters:   01/09/23 151 lb (68.5 kg)   12/10/20 178 lb (80.7 kg) 07/30/19 164 lb (74.4 kg)         Physical Exam:   General appearance - alert, well appearing, and in no distress. Mental status - A/O x 4,normal mood and affect. Chest - CTA. Symmetric chest rise. No wheezing. No distress. Heart - Normal rate & rhythm. Normal S1 & S2. No MGR. Abdomen- Soft, round. Non-distended, lower abd TTP HUNTER. No pulsatile masses or hernias. Ext-  No pedal edema, clubbing, or cyanosis. Skin-Warm and dry. No hyperpigmentation, ulcerations, or suspicious lesions. Neuro - Normal speech, no focal findings or movement disorder. Normal strength, gait, and muscle tone. An electronic signature was used to authenticate this note.   -- Sarah Thomas NP

## 2023-01-11 LAB
25(OH)D3+25(OH)D2 SERPL-MCNC: 10.9 NG/ML (ref 30–100)
ALBUMIN SERPL-MCNC: 5 G/DL (ref 3.9–5)
ALBUMIN/GLOB SERPL: 1.9 {RATIO} (ref 1.2–2.2)
ALP SERPL-CCNC: 53 IU/L (ref 44–121)
ALT SERPL-CCNC: 13 IU/L (ref 0–32)
APPEARANCE UR: CLEAR
AST SERPL-CCNC: 15 IU/L (ref 0–40)
BACTERIA #/AREA URNS HPF: NORMAL /[HPF]
BASOPHILS # BLD AUTO: 0.1 X10E3/UL (ref 0–0.2)
BASOPHILS NFR BLD AUTO: 1 %
BILIRUB SERPL-MCNC: 0.6 MG/DL (ref 0–1.2)
BILIRUB UR QL STRIP: NEGATIVE
BUN SERPL-MCNC: 9 MG/DL (ref 6–20)
BUN/CREAT SERPL: 13 (ref 9–23)
CALCIUM SERPL-MCNC: 9.6 MG/DL (ref 8.7–10.2)
CASTS URNS QL MICRO: NORMAL /LPF
CHLORIDE SERPL-SCNC: 100 MMOL/L (ref 96–106)
CO2 SERPL-SCNC: 21 MMOL/L (ref 20–29)
COLOR UR: YELLOW
CREAT SERPL-MCNC: 0.68 MG/DL (ref 0.57–1)
EGFR: 120 ML/MIN/1.73
EOSINOPHIL # BLD AUTO: 0.1 X10E3/UL (ref 0–0.4)
EOSINOPHIL NFR BLD AUTO: 1 %
EPI CELLS #/AREA URNS HPF: NORMAL /HPF (ref 0–10)
ERYTHROCYTE [DISTWIDTH] IN BLOOD BY AUTOMATED COUNT: 12.1 % (ref 11.7–15.4)
GLOBULIN SER CALC-MCNC: 2.6 G/DL (ref 1.5–4.5)
GLUCOSE SERPL-MCNC: 79 MG/DL (ref 70–99)
GLUCOSE UR QL STRIP: NEGATIVE
HCT VFR BLD AUTO: 44.9 % (ref 34–46.6)
HCV GENTYP SERPL NAA+PROBE: NORMAL
HCV RNA SERPL NAA+PROBE-ACNC: NORMAL IU/ML
HCV RNA SERPL NAA+PROBE-LOG IU: NORMAL LOG10 IU/ML
HGB BLD-MCNC: 14.9 G/DL (ref 11.1–15.9)
HGB UR QL STRIP: NEGATIVE
HIV 1+2 AB+HIV1 P24 AG SERPL QL IA: NON REACTIVE
IMM GRANULOCYTES # BLD AUTO: 0 X10E3/UL (ref 0–0.1)
IMM GRANULOCYTES NFR BLD AUTO: 0 %
KETONES UR QL STRIP: NEGATIVE
LEUKOCYTE ESTERASE UR QL STRIP: NEGATIVE
LYMPHOCYTES # BLD AUTO: 1.8 X10E3/UL (ref 0.7–3.1)
LYMPHOCYTES NFR BLD AUTO: 42 %
MCH RBC QN AUTO: 32.1 PG (ref 26.6–33)
MCHC RBC AUTO-ENTMCNC: 33.2 G/DL (ref 31.5–35.7)
MCV RBC AUTO: 97 FL (ref 79–97)
MICRO URNS: NORMAL
MICRO URNS: NORMAL
MONOCYTES # BLD AUTO: 0.3 X10E3/UL (ref 0.1–0.9)
MONOCYTES NFR BLD AUTO: 7 %
NEUTROPHILS # BLD AUTO: 2.1 X10E3/UL (ref 1.4–7)
NEUTROPHILS NFR BLD AUTO: 49 %
NITRITE UR QL STRIP: NEGATIVE
PH UR STRIP: 6.5 [PH] (ref 5–7.5)
PLATELET # BLD AUTO: 319 X10E3/UL (ref 150–450)
POTASSIUM SERPL-SCNC: 4.3 MMOL/L (ref 3.5–5.2)
PROT SERPL-MCNC: 7.6 G/DL (ref 6–8.5)
PROT UR QL STRIP: NEGATIVE
RBC # BLD AUTO: 4.64 X10E6/UL (ref 3.77–5.28)
RBC #/AREA URNS HPF: NORMAL /HPF (ref 0–2)
SODIUM SERPL-SCNC: 140 MMOL/L (ref 134–144)
SP GR UR STRIP: 1.02 (ref 1–1.03)
TEST INFORMATION: NORMAL
TREPONEMA PALLIDUM IGG+IGM AB [PRESENCE] IN SERUM OR PLASMA BY IMMUNOASSAY: NON REACTIVE
TSH SERPL DL<=0.005 MIU/L-ACNC: 1.83 UIU/ML (ref 0.45–4.5)
URINALYSIS REFLEX, 377202: NORMAL
UROBILINOGEN UR STRIP-MCNC: 0.2 MG/DL (ref 0.2–1)
WBC # BLD AUTO: 4.3 X10E3/UL (ref 3.4–10.8)
WBC #/AREA URNS HPF: NORMAL /HPF (ref 0–5)

## 2023-01-12 LAB
A VAGINAE DNA VAG QL NAA+PROBE: ABNORMAL SCORE
BVAB2 DNA VAG QL NAA+PROBE: ABNORMAL SCORE
C ALBICANS DNA VAG QL NAA+PROBE: NEGATIVE
C GLABRATA DNA VAG QL NAA+PROBE: NEGATIVE
C TRACH RRNA SPEC QL NAA+PROBE: NEGATIVE
MEGA1 DNA VAG QL NAA+PROBE: ABNORMAL SCORE
N GONORRHOEA RRNA SPEC QL NAA+PROBE: NEGATIVE
SPECIMEN SOURCE: ABNORMAL
T VAGINALIS RRNA SPEC QL NAA+PROBE: NEGATIVE

## 2023-01-13 DIAGNOSIS — E55.9 VITAMIN D DEFICIENCY: Primary | ICD-10-CM

## 2023-01-13 RX ORDER — ERGOCALCIFEROL 1.25 MG/1
50000 CAPSULE ORAL
Qty: 12 CAPSULE | Refills: 3 | Status: SHIPPED | OUTPATIENT
Start: 2023-01-13

## 2023-01-13 NOTE — PROGRESS NOTES
Overall, your LABS look GREAT/NORMAL! Keep up the good work! You have a VITAMIN D DEFICIENCY. Please start new prescription for Vitamin D sent to pharmacy. Also try getting at least one hour of direct sunlight helps raise your vitamin D level. Continue current regimen. Eat a balanced diet, low-carb, low-salt AND exercise at least 150 minutes per week of moderate activity. If you begin to feel short of breath, dizzy, lightheaded, or have chest pain; STOP. If your symptoms DO NOT resolve after several minutes, DO NOT resume activity; you may need to call the office, report to an urgent care facility, or ER for chest pain.

## 2023-01-13 NOTE — PROGRESS NOTES
No STDs, yay! Since you didn't report any vaginal discharge, then results does NOT indicate bacterial vaginosis either.

## 2023-05-19 RX ORDER — SERTRALINE HYDROCHLORIDE 25 MG/1
25 TABLET, FILM COATED ORAL DAILY
COMMUNITY
Start: 2019-07-30

## 2023-05-19 RX ORDER — HYDROXYZINE HYDROCHLORIDE 25 MG/1
TABLET, FILM COATED ORAL
COMMUNITY
Start: 2019-06-10

## 2023-05-19 RX ORDER — ALBUTEROL SULFATE 90 UG/1
2 AEROSOL, METERED RESPIRATORY (INHALATION) EVERY 4 HOURS PRN
COMMUNITY
Start: 2023-01-09

## 2023-05-19 RX ORDER — ERGOCALCIFEROL 1.25 MG/1
50000 CAPSULE ORAL
COMMUNITY
Start: 2023-01-13

## 2023-05-19 RX ORDER — FLUTICASONE PROPIONATE 110 UG/1
1 AEROSOL, METERED RESPIRATORY (INHALATION) EVERY 12 HOURS
COMMUNITY
Start: 2023-01-09